# Patient Record
Sex: MALE | Race: BLACK OR AFRICAN AMERICAN | NOT HISPANIC OR LATINO | Employment: FULL TIME | URBAN - METROPOLITAN AREA
[De-identification: names, ages, dates, MRNs, and addresses within clinical notes are randomized per-mention and may not be internally consistent; named-entity substitution may affect disease eponyms.]

---

## 2020-03-19 ENCOUNTER — HOSPITAL ENCOUNTER (EMERGENCY)
Facility: HOSPITAL | Age: 20
Discharge: HOME | End: 2020-03-19
Attending: EMERGENCY MEDICINE
Payer: MEDICAID

## 2020-03-19 VITALS
WEIGHT: 257 LBS | OXYGEN SATURATION: 97 % | TEMPERATURE: 99 F | SYSTOLIC BLOOD PRESSURE: 133 MMHG | HEART RATE: 75 BPM | HEIGHT: 71 IN | DIASTOLIC BLOOD PRESSURE: 62 MMHG | BODY MASS INDEX: 35.98 KG/M2 | RESPIRATION RATE: 16 BRPM

## 2020-03-19 DIAGNOSIS — R21 RASH: Primary | ICD-10-CM

## 2020-03-19 PROCEDURE — 99283 EMERGENCY DEPT VISIT LOW MDM: CPT

## 2020-03-19 RX ORDER — HYDROCORTISONE 1 %
CREAM (GRAM) TOPICAL
Qty: 30 G | Refills: 0 | Status: SHIPPED | OUTPATIENT
Start: 2020-03-19

## 2020-03-20 NOTE — ED NOTES
Pt reports moving into new apartment and noticed multiple insect bites to bilat arms and legs. Pt says he has tried cortisone cream and it has not helped.

## 2020-03-20 NOTE — ED PROVIDER NOTES
Encounter Date: 3/19/2020    SCRIBE #1 NOTE: I, Hyacinth Christensen, am scribing for, and in the presence of, Clay Kelly MD.       History     Chief Complaint   Patient presents with    Rash       Time seen by provider: 7:56 PM on 03/19/2020    David Jara is a 19 y.o. male with no PMHx or SHx who presents to the ED with complaints of an itchy rash to bilateral arms and legs. Rash was notice a couple days ago. He denies lesions in the mouth. Patient mentions recently moving from [place] to [place]. He denies other symptoms or pain. Denies other family or friends having a similar rash. Patient reports recently playing basketball outside and suspected mosquito bites. Patient denies fever, chills, or other new symptoms. Patient has no other medical concerns or complaints at this moment. NKDA.     The history is provided by the patient.     Review of patient's allergies indicates:  No Known Allergies  No past medical history on file.  No past surgical history on file.  No family history on file.  Social History     Tobacco Use    Smoking status: Not on file   Substance Use Topics    Alcohol use: Not on file    Drug use: Not on file     Review of Systems   Constitutional: Negative for activity change, chills and fever.   HENT: Negative for congestion, rhinorrhea and sore throat.    Respiratory: Negative for cough and shortness of breath.    Musculoskeletal: Negative for gait problem and joint swelling.   Skin: Positive for rash. Negative for color change and pallor.   Neurological: Negative for weakness.   Hematological: Does not bruise/bleed easily.   Psychiatric/Behavioral: Negative for confusion. The patient is not nervous/anxious.        Physical Exam     Initial Vitals [03/19/20 1810]   BP Pulse Resp Temp SpO2   133/62 75 16 98.9 °F (37.2 °C) 97 %      MAP       --         Physical Exam    Nursing note and vitals reviewed.  Constitutional: He appears well-developed. No distress.   HENT:   Head: Normocephalic  and atraumatic.   Nose: Nose normal.   Eyes: EOM are normal.   Neck: Neck supple.   Cardiovascular: Normal rate, regular rhythm, normal heart sounds and intact distal pulses. Exam reveals no gallop and no friction rub.    No murmur heard.  Pulmonary/Chest: Breath sounds normal. No respiratory distress. He has no wheezes. He has no rhonchi. He has no rales.   Abdominal: Soft. There is no tenderness.   Musculoskeletal: Normal range of motion.   Neurological: He is alert and oriented to person, place, and time. No cranial nerve deficit.   Skin: Skin is warm and dry. Capillary refill takes less than 2 seconds. Rash noted. Rash is papular.   Multiple small papular rash to bilateral upper and lower extremities, none on torso. No vesicles.    Psychiatric: He has a normal mood and affect.         ED Course   Procedures  Labs Reviewed - No data to display       Imaging Results    None          Medical Decision Making:   History:   Old Medical Records: I decided to obtain old medical records.  ED Management:  Rash is papular. No vesicles.  No oral involvement.  Does not involve palms or soles.  No petechia or purpura.   Patient has currently been stabilized in the emergency department. Patient is non-toxic appearing and well hydrated.    Patient's symptoms not typical for other emergent causes of rash such as cellulitis, necrotizing fasciitis, vasculitis, anaphylaxis, SJS or TENS. Patient will be discharged with strict return precautions and follow up with primary MD within 24 hours for further evaluation.              Scribe Attestation:   Scribe #1: I performed the above scribed service and the documentation accurately describes the services I performed. I attest to the accuracy of the note.        Attending Attestation:     Physician Attestation for Scribe:    I, Dr. Clay Kelly, personally performed the services described in this documentation.   All medical record entries made by the scribe were at my direction and in  my presence.   I have reviewed the chart and agree that the record is accurate and complete.   Clay Kelly MD  1:53 AM 03/20/2020     DISCLAIMER: This note was prepared with [employer] voice recognition transcription software. Garbled syntax, mangled pronouns, and other bizarre constructions may be attributed to that software system.                  Clinical Impression:       ICD-10-CM ICD-9-CM   1. Rash R21 782.1         Disposition:   Disposition: Discharged  Condition: Stable     ED Disposition Condition    Discharge Stable        ED Prescriptions     Medication Sig Dispense Start Date End Date Auth. Provider    hydrocortisone 1 % cream Apply to affected area 2 times daily 30 g 3/19/2020  Clay Kelly MD        Follow-up Information     Follow up With Specialties Details Why Contact Info    [facility] in 1 day  [address] [address]  [phone]      [facility]  As needed, If symptoms worsen [address] [place] [id number] [phone]                                     Clay Kelly MD  03/20/20 0154

## 2021-06-09 ENCOUNTER — HOSPITAL ENCOUNTER (EMERGENCY)
Facility: HOSPITAL | Age: 21
Discharge: HOME | End: 2021-06-09
Attending: EMERGENCY MEDICINE
Payer: MEDICAID

## 2021-06-09 VITALS
SYSTOLIC BLOOD PRESSURE: 127 MMHG | BODY MASS INDEX: 37.8 KG/M2 | OXYGEN SATURATION: 100 % | WEIGHT: 270 LBS | HEIGHT: 71 IN | TEMPERATURE: 99 F | HEART RATE: 77 BPM | DIASTOLIC BLOOD PRESSURE: 82 MMHG | RESPIRATION RATE: 20 BRPM

## 2021-06-09 DIAGNOSIS — M25.579 ANKLE PAIN: ICD-10-CM

## 2021-06-09 DIAGNOSIS — S93.401A SPRAIN OF RIGHT ANKLE, UNSPECIFIED LIGAMENT, INITIAL ENCOUNTER: Primary | ICD-10-CM

## 2021-06-09 PROCEDURE — 99284 EMERGENCY DEPT VISIT MOD MDM: CPT | Mod: 25,ER

## 2021-06-09 RX ORDER — MELOXICAM 15 MG/1
15 TABLET ORAL DAILY
Qty: 30 TABLET | Refills: 0 | Status: SHIPPED | OUTPATIENT
Start: 2021-06-09

## 2021-06-09 RX ORDER — FAMOTIDINE 20 MG/1
20 TABLET, FILM COATED ORAL 2 TIMES DAILY
Qty: 60 TABLET | Refills: 0 | Status: SHIPPED | OUTPATIENT
Start: 2021-06-09 | End: 2022-06-09

## 2024-03-18 ENCOUNTER — OCCUPATIONAL HEALTH (OUTPATIENT)
Dept: URGENT CARE | Facility: CLINIC | Age: 24
End: 2024-03-18

## 2024-03-18 DIAGNOSIS — Z02.83 ENCOUNTER FOR DRUG SCREENING: Primary | ICD-10-CM

## 2024-03-18 PROCEDURE — 80305 DRUG TEST PRSMV DIR OPT OBS: CPT | Mod: S$GLB,,, | Performed by: STUDENT IN AN ORGANIZED HEALTH CARE EDUCATION/TRAINING PROGRAM

## 2024-08-31 ENCOUNTER — HOSPITAL ENCOUNTER (EMERGENCY)
Facility: HOSPITAL | Age: 24
Discharge: HOME | End: 2024-08-31
Attending: EMERGENCY MEDICINE

## 2024-08-31 VITALS
WEIGHT: 260 LBS | TEMPERATURE: 99 F | SYSTOLIC BLOOD PRESSURE: 152 MMHG | RESPIRATION RATE: 18 BRPM | DIASTOLIC BLOOD PRESSURE: 84 MMHG | OXYGEN SATURATION: 100 % | HEART RATE: 71 BPM | BODY MASS INDEX: 38.51 KG/M2 | HEIGHT: 69 IN

## 2024-08-31 DIAGNOSIS — K21.9 GASTROESOPHAGEAL REFLUX DISEASE, UNSPECIFIED WHETHER ESOPHAGITIS PRESENT: ICD-10-CM

## 2024-08-31 DIAGNOSIS — F12.288 CANNABIS HYPEREMESIS SYNDROME CONCURRENT WITH AND DUE TO CANNABIS DEPENDENCE: Primary | ICD-10-CM

## 2024-08-31 LAB
ALBUMIN SERPL BCP-MCNC: 3.7 G/DL (ref 3.5–5.2)
ALP SERPL-CCNC: 75 U/L (ref 55–135)
ALT SERPL W/O P-5'-P-CCNC: 30 U/L (ref 10–44)
AMPHET+METHAMPHET UR QL: NEGATIVE
ANION GAP SERPL CALC-SCNC: 9 MMOL/L (ref 8–16)
AST SERPL-CCNC: 21 U/L (ref 10–40)
BARBITURATES UR QL SCN>200 NG/ML: NEGATIVE
BASOPHILS # BLD AUTO: 0.02 K/UL (ref 0–0.2)
BASOPHILS NFR BLD: 0.6 % (ref 0–1.9)
BENZODIAZ UR QL SCN>200 NG/ML: NEGATIVE
BILIRUB SERPL-MCNC: 0.3 MG/DL (ref 0.1–1)
BILIRUB UR QL STRIP: NEGATIVE
BUN SERPL-MCNC: 10 MG/DL (ref 6–20)
BZE UR QL SCN: NEGATIVE
CALCIUM SERPL-MCNC: 8.8 MG/DL (ref 8.7–10.5)
CANNABINOIDS UR QL SCN: ABNORMAL
CHLORIDE SERPL-SCNC: 107 MMOL/L (ref 95–110)
CLARITY UR: CLEAR
CO2 SERPL-SCNC: 22 MMOL/L (ref 23–29)
COLOR UR: COLORLESS
CREAT SERPL-MCNC: 1.3 MG/DL (ref 0.5–1.4)
CREAT UR-MCNC: 54.5 MG/DL (ref 23–375)
DIFFERENTIAL METHOD BLD: ABNORMAL
EOSINOPHIL # BLD AUTO: 0.1 K/UL (ref 0–0.5)
EOSINOPHIL NFR BLD: 2.7 % (ref 0–8)
ERYTHROCYTE [DISTWIDTH] IN BLOOD BY AUTOMATED COUNT: 12.5 % (ref 11.5–14.5)
EST. GFR  (NO RACE VARIABLE): >60 ML/MIN/1.73 M^2
GLUCOSE SERPL-MCNC: 102 MG/DL (ref 70–110)
GLUCOSE UR QL STRIP: NEGATIVE
HCT VFR BLD AUTO: 43.3 % (ref 40–54)
HGB BLD-MCNC: 14.8 G/DL (ref 14–18)
HGB UR QL STRIP: NEGATIVE
IMM GRANULOCYTES # BLD AUTO: 0.01 K/UL (ref 0–0.04)
IMM GRANULOCYTES NFR BLD AUTO: 0.3 % (ref 0–0.5)
KETONES UR QL STRIP: NEGATIVE
LEUKOCYTE ESTERASE UR QL STRIP: NEGATIVE
LIPASE SERPL-CCNC: 19 U/L (ref 4–60)
LYMPHOCYTES # BLD AUTO: 1.4 K/UL (ref 1–4.8)
LYMPHOCYTES NFR BLD: 42.2 % (ref 18–48)
MCH RBC QN AUTO: 30.5 PG (ref 27–31)
MCHC RBC AUTO-ENTMCNC: 34.2 G/DL (ref 32–36)
MCV RBC AUTO: 89 FL (ref 82–98)
METHADONE UR QL SCN>300 NG/ML: NEGATIVE
MONOCYTES # BLD AUTO: 0.3 K/UL (ref 0.3–1)
MONOCYTES NFR BLD: 9.1 % (ref 4–15)
NEUTROPHILS # BLD AUTO: 1.5 K/UL (ref 1.8–7.7)
NEUTROPHILS NFR BLD: 45.1 % (ref 38–73)
NITRITE UR QL STRIP: NEGATIVE
NRBC BLD-RTO: 0 /100 WBC
OPIATES UR QL SCN: NEGATIVE
PCP UR QL SCN>25 NG/ML: NEGATIVE
PH UR STRIP: 7 [PH] (ref 5–8)
PLATELET # BLD AUTO: 220 K/UL (ref 150–450)
PMV BLD AUTO: 10.3 FL (ref 9.2–12.9)
POTASSIUM SERPL-SCNC: 3.8 MMOL/L (ref 3.5–5.1)
PROT SERPL-MCNC: 6.8 G/DL (ref 6–8.4)
PROT UR QL STRIP: NEGATIVE
RBC # BLD AUTO: 4.86 M/UL (ref 4.6–6.2)
SODIUM SERPL-SCNC: 138 MMOL/L (ref 136–145)
SP GR UR STRIP: 1.01 (ref 1–1.03)
TOXICOLOGY INFORMATION: ABNORMAL
URN SPEC COLLECT METH UR: ABNORMAL
UROBILINOGEN UR STRIP-ACNC: NEGATIVE EU/DL
WBC # BLD AUTO: 3.39 K/UL (ref 3.9–12.7)

## 2024-08-31 PROCEDURE — 83690 ASSAY OF LIPASE: CPT | Performed by: EMERGENCY MEDICINE

## 2024-08-31 PROCEDURE — 25000003 PHARM REV CODE 250: Performed by: EMERGENCY MEDICINE

## 2024-08-31 PROCEDURE — 63600175 PHARM REV CODE 636 W HCPCS: Performed by: EMERGENCY MEDICINE

## 2024-08-31 PROCEDURE — 96361 HYDRATE IV INFUSION ADD-ON: CPT

## 2024-08-31 PROCEDURE — 80053 COMPREHEN METABOLIC PANEL: CPT | Performed by: EMERGENCY MEDICINE

## 2024-08-31 PROCEDURE — 99284 EMERGENCY DEPT VISIT MOD MDM: CPT | Mod: 25

## 2024-08-31 PROCEDURE — 81003 URINALYSIS AUTO W/O SCOPE: CPT | Mod: 59 | Performed by: EMERGENCY MEDICINE

## 2024-08-31 PROCEDURE — 85025 COMPLETE CBC W/AUTO DIFF WBC: CPT | Performed by: EMERGENCY MEDICINE

## 2024-08-31 PROCEDURE — 36415 COLL VENOUS BLD VENIPUNCTURE: CPT | Performed by: EMERGENCY MEDICINE

## 2024-08-31 PROCEDURE — 96374 THER/PROPH/DIAG INJ IV PUSH: CPT

## 2024-08-31 PROCEDURE — 80307 DRUG TEST PRSMV CHEM ANLYZR: CPT | Performed by: EMERGENCY MEDICINE

## 2024-08-31 RX ORDER — ONDANSETRON 4 MG/1
4 TABLET, ORALLY DISINTEGRATING ORAL EVERY 6 HOURS PRN
Qty: 10 TABLET | Refills: 0 | Status: SHIPPED | OUTPATIENT
Start: 2024-08-31

## 2024-08-31 RX ORDER — PANTOPRAZOLE SODIUM 40 MG/10ML
40 INJECTION, POWDER, LYOPHILIZED, FOR SOLUTION INTRAVENOUS
Status: COMPLETED | OUTPATIENT
Start: 2024-08-31 | End: 2024-08-31

## 2024-08-31 RX ORDER — HYOSCYAMINE SULFATE 0.12 MG/1
0.12 TABLET SUBLINGUAL EVERY 4 HOURS PRN
Qty: 10 TABLET | Refills: 0 | Status: SHIPPED | OUTPATIENT
Start: 2024-08-31

## 2024-08-31 RX ORDER — ALUMINUM HYDROXIDE, MAGNESIUM HYDROXIDE, AND SIMETHICONE 1200; 120; 1200 MG/30ML; MG/30ML; MG/30ML
30 SUSPENSION ORAL
Status: DISCONTINUED | OUTPATIENT
Start: 2024-08-31 | End: 2024-08-31 | Stop reason: HOSPADM

## 2024-08-31 RX ORDER — FAMOTIDINE 20 MG/1
20 TABLET, FILM COATED ORAL 2 TIMES DAILY
Qty: 20 TABLET | Refills: 0 | Status: SHIPPED | OUTPATIENT
Start: 2024-08-31 | End: 2024-09-10

## 2024-08-31 RX ORDER — SUCRALFATE 1 G/10ML
1 SUSPENSION ORAL EVERY 6 HOURS
Status: DISCONTINUED | OUTPATIENT
Start: 2024-08-31 | End: 2024-08-31 | Stop reason: HOSPADM

## 2024-08-31 RX ADMIN — SUCRALFATE 1 G: 1 SUSPENSION ORAL at 05:08

## 2024-08-31 RX ADMIN — ALUMINUM HYDROXIDE, MAGNESIUM HYDROXIDE, AND SIMETHICONE 30 ML: 1200; 120; 1200 SUSPENSION ORAL at 03:08

## 2024-08-31 RX ADMIN — SODIUM CHLORIDE, SODIUM LACTATE, POTASSIUM CHLORIDE, AND CALCIUM CHLORIDE 1000 ML: .6; .31; .03; .02 INJECTION, SOLUTION INTRAVENOUS at 02:08

## 2024-08-31 RX ADMIN — PANTOPRAZOLE SODIUM 40 MG: 40 INJECTION, POWDER, FOR SOLUTION INTRAVENOUS at 02:08

## 2024-08-31 NOTE — ED PROVIDER NOTES
Encounter Date: 8/31/2024       History     Chief Complaint   Patient presents with    Abdominal Pain     X3 days     Chest Congestion     24-year-old male no significant past medical problems.  Patient presents emergency department with complaint of generalized abdominal cramping note associated nausea over last 3 days.  Patient states also has had mild chest congestion as well.  Denies fever, no vomiting, did have diarrhea associated yesterday.  Patient denies ill contacts.  Patient does admit to feeling as if food is difficult to swallow at times.  Has been using marijuana over last several days as well and has noticed that he has had increased abdominal pain with use.  He denies any hematochezia, no constipation, no sore throat, denies any other constitutional symptoms.      Review of patient's allergies indicates:  No Known Allergies  History reviewed. No pertinent past medical history.  History reviewed. No pertinent surgical history.  No family history on file.  Social History     Tobacco Use    Smoking status: Never     Review of Systems   Constitutional:  Negative for appetite change, fatigue and fever.   HENT:  Negative for sore throat.    Respiratory:  Negative for shortness of breath.    Cardiovascular:  Negative for chest pain.   Gastrointestinal:  Positive for abdominal pain and nausea. Negative for vomiting.   Genitourinary:  Negative for dysuria.   Musculoskeletal:  Negative for arthralgias, back pain and myalgias.   Skin:  Negative for rash.   Neurological:  Negative for weakness.   Hematological:  Does not bruise/bleed easily.       Physical Exam     Initial Vitals [08/31/24 1418]   BP Pulse Resp Temp SpO2   138/78 71 18 98.5 °F (36.9 °C) 100 %      MAP       --         Physical Exam    Nursing note and vitals reviewed.  Constitutional: He appears well-developed and well-nourished.   HENT:   Head: Normocephalic and atraumatic.   Nose: Nose normal.   Mouth/Throat: Oropharynx is clear and moist.    Eyes: Conjunctivae and EOM are normal. Pupils are equal, round, and reactive to light. No scleral icterus.   Neck: Neck supple.   Normal range of motion.  Cardiovascular:  Normal rate, regular rhythm, normal heart sounds and intact distal pulses.     Exam reveals no gallop and no friction rub.       No murmur heard.  Pulmonary/Chest: No stridor. No respiratory distress.   Course bilateral breath sounds no adventitious sounds   Abdominal: Abdomen is soft. Bowel sounds are normal. He exhibits no mass. There is no abdominal tenderness. There is no rebound and no guarding.   Musculoskeletal:         General: No edema. Normal range of motion.      Cervical back: Normal range of motion and neck supple.     Lymphadenopathy:     He has no cervical adenopathy.   Neurological: He is alert and oriented to person, place, and time. He has normal strength and normal reflexes. No cranial nerve deficit or sensory deficit. GCS score is 15. GCS eye subscore is 4. GCS verbal subscore is 5. GCS motor subscore is 6.   Skin: Skin is warm and dry. Capillary refill takes less than 2 seconds. No rash noted.   Psychiatric: He has a normal mood and affect. His behavior is normal. Judgment and thought content normal.         ED Course   Procedures  Labs Reviewed   CBC W/ AUTO DIFFERENTIAL - Abnormal       Result Value    WBC 3.39 (*)     RBC 4.86      Hemoglobin 14.8      Hematocrit 43.3      MCV 89      MCH 30.5      MCHC 34.2      RDW 12.5      Platelets 220      MPV 10.3      Immature Granulocytes 0.3      Gran # (ANC) 1.5 (*)     Immature Grans (Abs) 0.01      Lymph # 1.4      Mono # 0.3      Eos # 0.1      Baso # 0.02      nRBC 0      Gran % 45.1      Lymph % 42.2      Mono % 9.1      Eosinophil % 2.7      Basophil % 0.6      Differential Method Automated     COMPREHENSIVE METABOLIC PANEL - Abnormal    Sodium 138      Potassium 3.8      Chloride 107      CO2 22 (*)     Glucose 102      BUN 10      Creatinine 1.3      Calcium 8.8       Total Protein 6.8      Albumin 3.7      Total Bilirubin 0.3      Alkaline Phosphatase 75      AST 21      ALT 30      eGFR >60      Anion Gap 9     URINALYSIS, REFLEX TO URINE CULTURE - Abnormal    Specimen UA Urine, Clean Catch      Color, UA Colorless (*)     Appearance, UA Clear      pH, UA 7.0      Specific Gravity, UA 1.010      Protein, UA Negative      Glucose, UA Negative      Ketones, UA Negative      Bilirubin (UA) Negative      Occult Blood UA Negative      Nitrite, UA Negative      Urobilinogen, UA Negative      Leukocytes, UA Negative      Narrative:     Specimen Source->Urine   DRUG SCREEN PANEL, URINE EMERGENCY - Abnormal    Benzodiazepines Negative      Methadone metabolites Negative      Cocaine (Metab.) Negative      Opiate Scrn, Ur Negative      Barbiturate Screen, Ur Negative      Amphetamine Screen, Ur Negative      THC Presumptive Positive (*)     Phencyclidine Negative      Creatinine, Urine 54.5      Toxicology Information SEE COMMENT      Narrative:     Specimen Source->Urine   LIPASE    Lipase 19            Imaging Results    None          Medications   lactated ringers bolus 1,000 mL (0 mLs Intravenous Stopped 8/31/24 1545)   pantoprazole injection 40 mg (40 mg Intravenous Given 8/31/24 1445)     Medical Decision Making  Amount and/or Complexity of Data Reviewed  Labs: ordered.    Risk  OTC drugs.  Prescription drug management.              Attending Attestation:   Physician Attestation Statement for Resident:  As the supervising MD  .  -: 24-year-old male no significant past medical problems.  Patient presents emergency department with complaint of generalized abdominal cramping note associated nausea over last 3 days.  Patient states also has had mild chest congestion as well.  Denies fever, no vomiting, did have diarrhea associated yesterday.  Patient denies ill contacts.  Patient does admit to feeling as if food is difficult to swallow at times.  Has been using marijuana over last  several days as well and has noticed that he has had increased abdominal pain with use.  He denies any hematochezia, no constipation, no sore throat, denies any other constitutional symptoms.                                   Medical Decision Making:   Initial Assessment:   24-year-old male no significant past medical problems.  Patient presents emergency department with complaint of generalized abdominal cramping note associated nausea over last 3 days.  Patient states also has had mild chest congestion as well.  Denies fever, no vomiting, did have diarrhea associated yesterday.  Patient denies ill contacts.  Patient does admit to feeling as if food is difficult to swallow at times.  Has been using marijuana over last several days as well and has noticed that he has had increased abdominal pain with use.  He denies any hematochezia, no constipation, no sore throat, denies any other constitutional symptoms.      Differential Diagnosis:   Gastritis, enteritis, marijuana induced dyspepsia/cyclic vomiting syndrome, achalasia, gastroesophageal reflux  Clinical Tests:   Lab Tests: Ordered and Reviewed  Radiological Study: Ordered and Reviewed             Clinical Impression:  Final diagnoses:  [F12.288] Cannabis hyperemesis syndrome concurrent with and due to cannabis dependence (Primary)  [K21.9] Gastroesophageal reflux disease, unspecified whether esophagitis present          ED Disposition Condition    Discharge Stable          ED Prescriptions       Medication Sig Dispense Start Date End Date Auth. Provider    famotidine (PEPCID) 20 MG tablet Take 1 tablet (20 mg total) by mouth 2 (two) times daily. for 10 days 20 tablet 8/31/2024 9/10/2024 Humberto Cheek MD    ondansetron (ZOFRAN-ODT) 4 MG TbDL Take 1 tablet (4 mg total) by mouth every 6 (six) hours as needed (Nausea vomiting). 10 tablet 8/31/2024 -- Humberto Cheek MD    hyoscyamine 0.125 mg Subl Place 1 tablet (0.125 mg total) under the tongue every 4 (four)  hours as needed. 10 tablet 8/31/2024 -- Humberto Cheek MD          Follow-up Information    None          Humberto Cheek MD  08/31/24 9172

## 2024-10-10 ENCOUNTER — HOSPITAL ENCOUNTER (EMERGENCY)
Facility: HOSPITAL | Age: 24
Discharge: HOME | End: 2024-10-11
Attending: EMERGENCY MEDICINE
Payer: COMMERCIAL

## 2024-10-10 VITALS
RESPIRATION RATE: 16 BRPM | HEIGHT: 69 IN | WEIGHT: 283 LBS | SYSTOLIC BLOOD PRESSURE: 143 MMHG | BODY MASS INDEX: 41.92 KG/M2 | OXYGEN SATURATION: 99 % | TEMPERATURE: 98 F | HEART RATE: 75 BPM | DIASTOLIC BLOOD PRESSURE: 94 MMHG

## 2024-10-10 DIAGNOSIS — M25.442 SWELLING OF HAND JOINT, LEFT: Primary | ICD-10-CM

## 2024-10-10 PROCEDURE — 87389 HIV-1 AG W/HIV-1&-2 AB AG IA: CPT | Performed by: EMERGENCY MEDICINE

## 2024-10-10 PROCEDURE — 86803 HEPATITIS C AB TEST: CPT | Performed by: EMERGENCY MEDICINE

## 2024-10-10 PROCEDURE — 29125 APPL SHORT ARM SPLINT STATIC: CPT | Mod: LT

## 2024-10-10 PROCEDURE — 36415 COLL VENOUS BLD VENIPUNCTURE: CPT | Performed by: EMERGENCY MEDICINE

## 2024-10-10 PROCEDURE — 99283 EMERGENCY DEPT VISIT LOW MDM: CPT | Mod: 25

## 2024-10-10 RX ORDER — IBUPROFEN 600 MG/1
600 TABLET ORAL
Status: COMPLETED | OUTPATIENT
Start: 2024-10-11 | End: 2024-10-11

## 2024-10-10 NOTE — Clinical Note
"David Paigee" Estefani was seen and treated in our emergency department on 10/10/2024.  He may return to school on 10/11/2024.  With light duty for next week.    If you have any questions or concerns, please don't hesitate to call.      Itzel CONKLIN"

## 2024-10-11 LAB
HCV AB SERPL QL IA: NEGATIVE
HIV 1+2 AB+HIV1 P24 AG SERPL QL IA: NEGATIVE

## 2024-10-11 PROCEDURE — 25000003 PHARM REV CODE 250: Performed by: EMERGENCY MEDICINE

## 2024-10-11 RX ORDER — NAPROXEN 500 MG/1
500 TABLET ORAL 2 TIMES DAILY WITH MEALS
Qty: 30 TABLET | Refills: 0 | Status: SHIPPED | OUTPATIENT
Start: 2024-10-11

## 2024-10-11 RX ADMIN — IBUPROFEN 600 MG: 600 TABLET ORAL at 12:10

## 2024-10-11 NOTE — ED PROVIDER NOTES
Encounter Date: 10/10/2024       History     Chief Complaint   Patient presents with    Wrist Pain     When bending thumb, tingling going up arm since this morning     24-year-old male presented emergency department with pain in the left hand at the base of the left thumb in the thenar eminence.  Patient does have swelling of that area for a while and when he flexes his thumb has tingling going up into the forearm which he felt more today.  Patient denies any trauma.  Patient is in the process of getting a electromyogram for evaluation of his tingling in the left upper extremity.  Patient denies any trauma.  Denies fever or chills or nausea vomiting.  Denies any focal weakness or numbness other than pain in the left hand causing tingling when he bends his thumb.      Review of patient's allergies indicates:  No Known Allergies  History reviewed. No pertinent past medical history.  History reviewed. No pertinent surgical history.  Family History   Family history unknown: Yes     Social History     Tobacco Use    Smoking status: Former     Types: Cigarettes   Substance Use Topics    Alcohol use: Not Currently     Review of Systems   Constitutional: Negative.    HENT: Negative.     Eyes: Negative.    Respiratory: Negative.     Cardiovascular: Negative.    Gastrointestinal: Negative.    Endocrine: Negative.    Genitourinary: Negative.    Musculoskeletal:  Positive for arthralgias and joint swelling.   Skin: Negative.    Allergic/Immunologic: Negative.    Neurological: Negative.    Hematological: Negative.    Psychiatric/Behavioral: Negative.     All other systems reviewed and are negative.      Physical Exam     Initial Vitals [10/10/24 2328]   BP Pulse Resp Temp SpO2   (!) 143/94 75 16 98.3 °F (36.8 °C) 99 %      MAP       --         Physical Exam    Nursing note and vitals reviewed.  Constitutional: He appears well-developed and well-nourished.   HENT:   Head: Normocephalic and atraumatic.   Nose: Nose normal.   Eyes:  Conjunctivae and EOM are normal.   Neck: No tracheal deviation present.   Normal range of motion.  Cardiovascular:  Normal rate, regular rhythm, normal heart sounds and intact distal pulses.     Exam reveals no friction rub.       No murmur heard.  Pulmonary/Chest: Breath sounds normal. No respiratory distress. He has no wheezes. He has no rales.   Abdominal: Abdomen is soft. He exhibits no distension. There is no abdominal tenderness.   Musculoskeletal:         General: Tenderness present. Normal range of motion.      Cervical back: Normal range of motion.      Comments: Left hand thenar eminence inflamed and is tender in that area.  Extremity otherwise neurovascularly intact and when patient flexes the thumb has tingling going up into the forearm.  Intact distal pulses in the extremities and good capillary refill.  No redness noted.     Neurological: He is alert and oriented to person, place, and time. He has normal strength. No cranial nerve deficit or sensory deficit. GCS score is 15. GCS eye subscore is 4. GCS verbal subscore is 5. GCS motor subscore is 6.   Skin: Skin is warm and dry. Capillary refill takes less than 2 seconds.   Psychiatric: He has a normal mood and affect. Thought content normal.         ED Course   Procedures  Labs Reviewed   HIV 1 / 2 ANTIBODY       Result Value    HIV 1/2 Ag/Ab Negative      Narrative:     Release to patient->Immediate   HEPATITIS C ANTIBODY    Hepatitis C Ab Negative      Narrative:     Release to patient->Immediate          Imaging Results              X-Ray Hand 3 view Left (In process)                   X-Rays:   Independently Interpreted Readings:   Other Readings:  Left hand x-ray unremarkable    Medications   ibuprofen tablet 600 mg (600 mg Oral Given 10/11/24 0010)     Medical Decision Making  24-year-old male with left hand swelling and pain in the area of thenar eminence.  Patient does have tingling radiating up the forearm when flexing the thumb.  This could be  secondary to local inflammation.  Treated with anti-inflammatory and will use a thumb spica splint to immobilize that area.  Advised rest, ice, elevation.  Patient to follow-up with primary care provider and patient has a electromyogram in the process of getting done ordered by primary care provider.  Given this discharged with return precautions and instructions and follow-up.  Patient does not have any central nervous system related neuro deficits.  Discharged with return precautions and instructions and follow-up.  Will treat with anti-inflammatories.    Amount and/or Complexity of Data Reviewed  Labs: ordered.  Radiology: ordered. Decision-making details documented in ED Course.  Discussion of management or test interpretation with external provider(s): Left thumb spica splint placed and extremities neurovascularly intact after splint placement    Risk  Prescription drug management.                                      Clinical Impression:  Final diagnoses:  [M25.442] Swelling of hand joint, left (Primary)          ED Disposition Condition    Discharge Stable          ED Prescriptions       Medication Sig Dispense Start Date End Date Auth. Provider    naproxen (NAPROSYN) 500 MG tablet Take 1 tablet (500 mg total) by mouth 2 (two) times daily with meals. 30 tablet 10/11/2024 -- Rakel García MD          Follow-up Information       Follow up With Specialties Details Why Contact Info    [facility]  In 2 days  [address] [place] [address]  [phone]               Rakel García MD  10/11/24 0051       Rakel García MD  10/11/24 0051

## 2024-10-11 NOTE — DISCHARGE INSTRUCTIONS
Follow-up with your primary care provider.  Keep thumb spica splint in place for comfort.  Rest left hand.  Return to emergency department for worsening symptoms or any problems.  Get electromyogram as scheduled.

## 2025-01-28 ENCOUNTER — OFFICE VISIT (OUTPATIENT)
Dept: URGENT CARE | Facility: CLINIC | Age: 25
End: 2025-01-28
Payer: COMMERCIAL

## 2025-01-28 VITALS
HEART RATE: 98 BPM | OXYGEN SATURATION: 96 % | TEMPERATURE: 98 F | WEIGHT: 260 LBS | BODY MASS INDEX: 37.22 KG/M2 | SYSTOLIC BLOOD PRESSURE: 129 MMHG | RESPIRATION RATE: 24 BRPM | HEIGHT: 70 IN | DIASTOLIC BLOOD PRESSURE: 83 MMHG

## 2025-01-28 DIAGNOSIS — R09.81 SINUS CONGESTION: ICD-10-CM

## 2025-01-28 DIAGNOSIS — J01.90 ACUTE BACTERIAL SINUSITIS: Primary | ICD-10-CM

## 2025-01-28 DIAGNOSIS — B96.89 ACUTE BACTERIAL SINUSITIS: Primary | ICD-10-CM

## 2025-01-28 DIAGNOSIS — R05.9 COUGH, UNSPECIFIED TYPE: ICD-10-CM

## 2025-01-28 DIAGNOSIS — J02.9 SORE THROAT: ICD-10-CM

## 2025-01-28 LAB
CTP QC/QA: YES
FLUAV AG NPH QL: NEGATIVE
FLUBV AG NPH QL: NEGATIVE
S PYO RRNA THROAT QL PROBE: NEGATIVE
SARS-COV-2 AG RESP QL IA.RAPID: NEGATIVE

## 2025-01-28 PROCEDURE — 87880 STREP A ASSAY W/OPTIC: CPT | Mod: QW,,,

## 2025-01-28 PROCEDURE — 87804 INFLUENZA ASSAY W/OPTIC: CPT | Mod: QW,,,

## 2025-01-28 PROCEDURE — 87811 SARS-COV-2 COVID19 W/OPTIC: CPT | Mod: QW,S$GLB,,

## 2025-01-28 PROCEDURE — 99204 OFFICE O/P NEW MOD 45 MIN: CPT | Mod: S$GLB,,,

## 2025-01-28 RX ORDER — AZITHROMYCIN 500 MG/1
500 TABLET, FILM COATED ORAL DAILY
Qty: 5 TABLET | Refills: 0 | Status: SHIPPED | OUTPATIENT
Start: 2025-01-28 | End: 2025-01-28

## 2025-01-28 RX ORDER — AZITHROMYCIN 500 MG/1
500 TABLET, FILM COATED ORAL DAILY
Qty: 5 TABLET | Refills: 0 | Status: SHIPPED | OUTPATIENT
Start: 2025-01-28 | End: 2025-02-02

## 2025-01-28 RX ORDER — FLUTICASONE PROPIONATE 50 MCG
1 SPRAY, SUSPENSION (ML) NASAL DAILY
Qty: 15.8 ML | Refills: 0 | Status: SHIPPED | OUTPATIENT
Start: 2025-01-28 | End: 2025-01-28

## 2025-01-28 RX ORDER — FLUTICASONE PROPIONATE 50 MCG
1 SPRAY, SUSPENSION (ML) NASAL DAILY
Qty: 15.8 ML | Refills: 0 | Status: SHIPPED | OUTPATIENT
Start: 2025-01-28

## 2025-01-28 NOTE — LETTER
January 28, 2025      [facility]  [address] [id number]       Patient: David Jara   YOB: 2000  Date of Visit: 01/28/2025    To Whom It May Concern:    Yoshi Jara  was at [facility] on 01/28/2025. The patient may return to work on 01/29/2025 with no restrictions. If you have any questions or concerns, or if I can be of further assistance, please do not hesitate to contact me.    Sincerely,    Joy Black NP

## 2025-01-28 NOTE — PATIENT INSTRUCTIONS
Symptomatic treatment to include:     Rest, increase fluid intake to include electrolyte replacement  Ibuprofen/Tylenol as directed for fever, sore throat, body aches  Medications as prescribed   Warm, salt water gargles, over the counter throat lozenges or sprays as desires.   ER for difficulty breathing not relieved by rest, excessive lethargy and/or change in mental status

## 2025-01-28 NOTE — PROGRESS NOTES
"Subjective:      Patient ID: David Jara is a 24 y.o. male.    Vitals:  height is 5' 10" (1.778 m) and weight is 117.9 kg (260 lb). His oral temperature is 98.2 °F (36.8 °C). His blood pressure is 129/83 and his pulse is 98. His respiration is 24 (abnormal) and oxygen saturation is 96%.     Chief Complaint: Sore Throat    24-year-old male presents for evaluation of sinus congestion, cough, postnasal drip, worsening sinus pain and pressure.  Symptoms initially started 1 week ago but worsened over the weekend.  Patient lost taste and smell with worsening sinus pain and pressure.  Denies fever, chills, body aches, shortness of breath or difficulty breathing    Sore Throat   Episode onset: x 3 days. Associated symptoms include congestion, coughing and headaches. Pertinent negatives include no shortness of breath. Treatments tried: mucinex.       Constitution: Positive for chills and fatigue. Negative for fever.   HENT:  Positive for congestion, postnasal drip, sinus pressure and sore throat.    Respiratory:  Positive for cough. Negative for shortness of breath and wheezing.    Neurological:  Positive for headaches.      Objective:     Physical Exam   Constitutional: He is oriented to person, place, and time. He appears well-developed. He is cooperative.  Non-toxic appearance. He does not appear ill. No distress.   HENT:   Head: Normocephalic and atraumatic.   Ears:   Right Ear: Hearing, tympanic membrane, external ear and ear canal normal.   Left Ear: Hearing, tympanic membrane, external ear and ear canal normal.   Nose: Rhinorrhea and congestion present. No mucosal edema or nasal deformity. No epistaxis. Right sinus exhibits no maxillary sinus tenderness and no frontal sinus tenderness. Left sinus exhibits no maxillary sinus tenderness and no frontal sinus tenderness.   Mouth/Throat: Uvula is midline and mucous membranes are normal. Mucous membranes are moist. No trismus in the jaw. Normal dentition. No " uvula swelling. Posterior oropharyngeal erythema present. No posterior oropharyngeal edema.   Eyes: Conjunctivae and lids are normal. No scleral icterus.   Neck: Trachea normal and phonation normal. Neck supple. No edema present. No erythema present. No neck rigidity present.   Cardiovascular: Normal rate, regular rhythm and normal heart sounds.   Pulmonary/Chest: Effort normal and breath sounds normal. No respiratory distress. He has no decreased breath sounds. He has no wheezes. He has no rhonchi. He has no rales.   Abdominal: Normal appearance.   Musculoskeletal: Normal range of motion.         General: No deformity. Normal range of motion.   Neurological: He is alert and oriented to person, place, and time. He displays no weakness. He exhibits normal muscle tone.   Skin: Skin is warm, dry, intact, not diaphoretic and not pale.   Psychiatric: His speech is normal and behavior is normal. Mood, judgment and thought content normal.   Nursing note and vitals reviewed.      Assessment:     1. Acute bacterial sinusitis    2. Sore throat    3. Cough, unspecified type    4. Sinus congestion        Plan:       Acute bacterial sinusitis  -     Discontinue: azithromycin (ZITHROMAX) 500 MG tablet; Take 1 tablet (500 mg total) by mouth once daily. for 5 days  Dispense: 5 tablet; Refill: 0  -     azithromycin (ZITHROMAX) 500 MG tablet; Take 1 tablet (500 mg total) by mouth once daily. for 5 days  Dispense: 5 tablet; Refill: 0    Sore throat  -     POCT rapid strep A    Cough, unspecified type  -     SARS Coronavirus 2 Antigen, POCT Manual Read  -     POCT Influenza A/B Rapid Antigen  -     Discontinue: pyrilamine-phenylephrine-DM 12.5-5-7.5 mg/5 mL Liqd; Take 10 mLs by mouth every 6 (six) hours as needed (cough).  Dispense: 350 mL; Refill: 0  -     pyrilamine-phenylephrine-DM 12.5-5-7.5 mg/5 mL Liqd; Take 10 mLs by mouth every 6 (six) hours as needed (cough).  Dispense: 350 mL; Refill: 0    Sinus congestion  -     Discontinue:  fluticasone propionate (FLONASE) 50 mcg/actuation nasal spray; 1 spray (50 mcg total) by Each Nostril route once daily.  Dispense: 15.8 mL; Refill: 0  -     fluticasone propionate (FLONASE) 50 mcg/actuation nasal spray; 1 spray (50 mcg total) by Each Nostril route once daily.  Dispense: 15.8 mL; Refill: 0      Strep: Negative  COVID: Negative  Flu: Negative    Discussed medication with patient who acknowledges understanding and is agreeable to POC. Follow up with primary care. Increase fluid intake. Red flags for ER discussed.